# Patient Record
Sex: MALE
[De-identification: names, ages, dates, MRNs, and addresses within clinical notes are randomized per-mention and may not be internally consistent; named-entity substitution may affect disease eponyms.]

---

## 2020-02-03 ENCOUNTER — HOSPITAL ENCOUNTER (EMERGENCY)
Dept: HOSPITAL 56 - MW.ED | Age: 35
Discharge: HOME | End: 2020-02-03
Payer: COMMERCIAL

## 2020-02-03 DIAGNOSIS — K04.7: Primary | ICD-10-CM

## 2020-02-03 NOTE — EDM.PDOC
ED HPI GENERAL MEDICAL PROBLEM





- General


Chief Complaint: ENT Problem


Stated Complaint: INFECTION IN MOUTH


Time Seen by Provider: 02/03/20 16:27


Source of Information: Reports: Patient


History Limitations: Reports: No Limitations





- History of Present Illness


INITIAL COMMENTS - FREE TEXT/NARRATIVE: 


HISTORY AND PHYSICAL:





History of present illness:


Patient is a 34-year-old male who presents to the emergency room with 

complaints of right lower dental pain that started this morning.  He states he 

called to set up an appointment with a dentist but is not able to be seen until 

tomorrow morning at 9 AM.  The pain has progressively gotten worse and he has 

noticed soft tissue swelling around the painful area.  Patient denies any fever

, chills, headache, change in vision, syncope or near syncope. Denies any chest 

pain, back pain, shortness of breath or cough. Denies any GI or  symptoms. 

Patient has been eating and drinking appropriately.





Review of systems: 


As per history of present illness and below otherwise all systems reviewed and 

negative.





Past medical history: 


As per history of present illness and as reviewed below otherwise 

noncontributory.





Surgical history: 


As per history of present illness and as reviewed below otherwise 

noncontributory.





Social history: 


See social history for further information





Family history: 


As per history of present illness and as reviewed below otherwise 

noncontributory.





Physical exam:


General: Well-developed and well-nourished 34-year-old male.  Alert and 

oriented.  Nontoxic-appearing and in no acute distress.


HEENT: Atraumatic, normocephalic, pupils equal and reactive bilaterally, 

negative for conjunctival pallor or scleral icterus, mucous membranes moist, 

soft tissue swelling and pain along the right lower mid dental area, dental 

caries noted, TMs normal bilaterally, throat clear, neck supple, nontender, 

trachea midline. No drooling or trismus noted. No meningeal signs. No hot 

potato voice noted. 


Lungs: Clear to auscultation, breath sounds equal bilaterally.


Heart: S1S2, regular rate and rhythm without overt murmur


Abdomen: Soft, nondistended, nontender


Skin: Intact, warm, dry. No lesions or rashes noted.


Extremities: Atraumatic, moves all extremities per self without difficulty or 

deficits. Neurovascular unremarkable.


Neuro: Awake, alert, oriented. Cranial nerves II through XII unremarkable. 

Cerebellum unremarkable. Motor and sensory unremarkable throughout. Exam 

nonfocal.





Notes:


Medication and supportive care measures were reviewed and discussed. Voices 

understanding and is agreeable to plan of care. Denies any further questions or 

concerns at this time.





Diagnostics:


None





Therapeutics:


None





Prescription:


Clindamycin


Norco (#10)





Impression: 


No abscess





Plan:


1. Please take the antibiotic as prescribed.


2. Tylenol and/or ibuprofen as needed for pain management. Norco for moderate 

to severe pain. This medication may cause drowsiness so do not take it while 

driving or needing to be functioning outside of the house.


3. Follow-up with a dentist for definitive care, keep your appointment for 

tomorrow. Return to the ED as needed and as discussed.





Definitive disposition and diagnosis as appropriate pending reevaluation and 

review of above.





  ** Teeth


Pain Score (Numeric/FACES): 9





- Related Data


 Allergies











Allergy/AdvReac Type Severity Reaction Status Date / Time


 


No Known Allergies Allergy   Verified 02/03/20 16:41











Home Meds: 


 Home Meds





Acetaminophen/HYDROcodone [Norco 325-5 MG] 1 dose PO Q4H #10 tablet 02/03/20 [Rx

]


Clindamycin HCl 300 mg PO TID 7 Days #21 capsule 02/03/20 [Rx]


Dextroamphetamine/Amphetamine [Adderall] 30 mg PO DAILY 02/03/20 [History]











ED ROS ENT





- Review of Systems


Review Of Systems: Comprehensive ROS is negative, except as noted in HPI.





ED EXAM, ENT





- Physical Exam


Exam: See Below (See dictation)





Course





- Vital Signs


Last Recorded V/S: 


 Last Vital Signs











Temp  96.5 F   02/03/20 16:41


 


Pulse  69   02/03/20 16:41


 


Resp  16   02/03/20 16:41


 


BP  143/94 H  02/03/20 16:41


 


Pulse Ox  96   02/03/20 16:41














Departure





- Departure


Time of Disposition: 16:50


Disposition: Home, Self-Care 01


Clinical Impression: 


 Dental abscess








- Discharge Information


Prescriptions: 


Acetaminophen/HYDROcodone [Norco 325-5 MG] 1 dose PO Q4H #10 tablet


Clindamycin HCl 300 mg PO TID 7 Days #21 capsule


Instructions:  Dental Abscess, Easy-to-Read


Referrals: 


PCP,Not In Area [Primary Care Provider] - 


Forms:  ED Department Discharge


Additional Instructions: 


The following information is given to patients seen in the emergency department 

who are being discharged to home. This information is to outline your options 

for follow-up care. We provide all patients seen in our emergency department 

with a follow-up referral.





The need for follow-up, as well as the timing and circumstances, are variable 

depending upon the specifics of your emergency department visit.





If you don't have a primary care physician on staff, we will provide you with a 

referral. We always advise you to contact your personal physician following an 

emergency department visit to inform them of the circumstance of the visit and 

for follow-up with them and/or the need for any referrals to a consulting 

specialist.





The emergency department will also refer you to a specialist when appropriate. 

This referral assures that you have the opportunity for follow-up care with a 

specialist. All of these measure are taken in an effort to provide you with 

optimal care, which includes your follow-up.





Under all circumstances we always encourage you to contact your private 

physician who remains a resource for coordinating your care. When calling for 

follow-up care, please make the office aware that this follow-up is from your 

recent emergency room visit. If for any reason you are refused follow-up, 

please contact the [facility] Emergency 

Department at [phone] and asked to speak to the emergency department 

charge nurse.





[facility]


Primary Care


[address] [place] [address]


Phone: [phone]


Fax: [phone]





[address] [address]


Phone: [phone]


Fax: [phone]





1. Please take the antibiotic as prescribed.


2. Tylenol and/or ibuprofen as needed for pain management. Norco for moderate 

to severe pain. This medication may cause drowsiness so do not take it while 

driving or needing to be functioning outside of the house.


3. Follow-up with a dentist for definitive care, keep your appointment for 

tomorrow. Return to the ED as needed and as discussed.





Sepsis Event Note





- Focused Exam


Vital Signs: 


 Vital Signs











  Temp Pulse Resp BP Pulse Ox


 


 02/03/20 16:41  96.5 F  69  16  143/94 H  96











Date Exam was Performed: 02/03/20


Time Exam was Performed: 16:51